# Patient Record
Sex: FEMALE
[De-identification: names, ages, dates, MRNs, and addresses within clinical notes are randomized per-mention and may not be internally consistent; named-entity substitution may affect disease eponyms.]

---

## 2022-10-27 PROBLEM — Z00.00 ENCOUNTER FOR PREVENTIVE HEALTH EXAMINATION: Status: ACTIVE | Noted: 2022-10-27

## 2022-10-28 ENCOUNTER — APPOINTMENT (OUTPATIENT)
Dept: ORTHOPEDIC SURGERY | Facility: CLINIC | Age: 42
End: 2022-10-28

## 2022-10-28 DIAGNOSIS — Z78.9 OTHER SPECIFIED HEALTH STATUS: ICD-10-CM

## 2022-10-28 DIAGNOSIS — Z86.018 PERSONAL HISTORY OF OTHER BENIGN NEOPLASM: ICD-10-CM

## 2022-10-28 DIAGNOSIS — M25.511 PAIN IN RIGHT SHOULDER: ICD-10-CM

## 2022-10-28 PROCEDURE — 73030 X-RAY EXAM OF SHOULDER: CPT | Mod: RT

## 2022-10-28 PROCEDURE — 99203 OFFICE O/P NEW LOW 30 MIN: CPT

## 2022-10-28 RX ORDER — TIRZEPATIDE 7.5 MG/.5ML
INJECTION, SOLUTION SUBCUTANEOUS
Refills: 0 | Status: ACTIVE | COMMUNITY

## 2022-11-06 ENCOUNTER — FORM ENCOUNTER (OUTPATIENT)
Age: 42
End: 2022-11-06

## 2022-11-17 RX ORDER — METHYLPREDNISOLONE 4 MG/1
4 TABLET ORAL
Qty: 1 | Refills: 0 | Status: ACTIVE | COMMUNITY
Start: 2022-11-17 | End: 1900-01-01

## 2022-11-17 NOTE — DISCUSSION/SUMMARY
[de-identified] : With a history of multiple osteochondromatosis and reoccurant symptoms in the right scapula I feel that further imaging (MRI of the RT scalpula) would be recommended.\par \par Patient sent for an MRI of right shoulder \par Patient will follow up after MRI for results and further evaluation \par Discussed options such as stretches and massages

## 2022-11-17 NOTE — HISTORY OF PRESENT ILLNESS
[de-identified] : Pt is here today for pain in her RT shoulder. Over the past three months she started having on and off pain. Now it hurts everyday and she has noticed that her right arm is longer than the left. Something that had happened before her surgery. Tylenol and ibuprofen for pain. PMHX multiple osteochondroma, 15 years ago she had a surgery to remove the one on her scapula.

## 2022-11-17 NOTE — PHYSICAL EXAM
[] : motor and sensory intact distally [Right] : right shoulder [FreeTextEntry9] : full ROM of cervical spine [FreeTextEntry3] : healed vertical surgical scar in right scapula [FreeTextEntry8] : Tenderness at scar [FreeTextEntry1] : osteochondroma.  [FreeTextEntry5] : Present xray of the RT scapula show some residual bone which is most probably previous surgery for excision of an osteochondroma.

## 2022-11-18 RX ORDER — IBUPROFEN 800 MG/1
800 TABLET, FILM COATED ORAL 3 TIMES DAILY
Qty: 90 | Refills: 0 | Status: ACTIVE | COMMUNITY
Start: 2022-11-18 | End: 1900-01-01

## 2022-12-06 ENCOUNTER — RESULT REVIEW (OUTPATIENT)
Age: 42
End: 2022-12-06

## 2022-12-12 ENCOUNTER — APPOINTMENT (OUTPATIENT)
Dept: ORTHOPEDIC SURGERY | Facility: CLINIC | Age: 42
End: 2022-12-12

## 2022-12-12 VITALS — HEIGHT: 66 IN | BODY MASS INDEX: 35.36 KG/M2 | WEIGHT: 220 LBS

## 2022-12-12 DIAGNOSIS — Z78.9 OTHER SPECIFIED HEALTH STATUS: ICD-10-CM

## 2022-12-12 DIAGNOSIS — Q78.6 MULTIPLE CONGENITAL EXOSTOSES: ICD-10-CM

## 2022-12-12 DIAGNOSIS — M54.12 RADICULOPATHY, CERVICAL REGION: ICD-10-CM

## 2022-12-12 DIAGNOSIS — M89.8X1 OTHER SPECIFIED DISORDERS OF BONE, SHOULDER: ICD-10-CM

## 2022-12-12 PROCEDURE — 99214 OFFICE O/P EST MOD 30 MIN: CPT

## 2022-12-12 PROCEDURE — 73010 X-RAY EXAM OF SHOULDER BLADE: CPT | Mod: RT

## 2022-12-12 RX ORDER — METHYLPREDNISOLONE 4 MG/1
4 TABLET ORAL
Qty: 1 | Refills: 0 | Status: ACTIVE | COMMUNITY
Start: 2022-12-12 | End: 1900-01-01

## 2022-12-18 PROBLEM — Q78.6: Status: ACTIVE | Noted: 2022-10-28

## 2022-12-18 NOTE — IMAGING
[de-identified] : RIGHT SHOULDER EXAM\par +ttp at medial scapular border. incision overlying scapula longitudinal, no erythema, well healed. Full range of shoulder with mild discomfort.\par Skin intact\par No muscle atrophy noted\par Shoulder ROM\par   Forward flexion to 160°; contralateral shoulder 160°\par   Abduction to 160°\par   ER with elbow at side to 45°; contralateral shoulder 45°\par   IR to L1\par \par - Julisa empty can test\par - Hawkin’s impingement test\par - Speed's test\par - Apprehension test\par - Lift-off test\par - Cross-body adduction test\par \par Rotator cuff strength is 5/5 throughout\par Hand is warm and well perfused with brisk capillary refill throughout\par Motor function intact to axillary, AIN, PIN, and ulnar nerves\par Sensation intact axillary, median, ulnar, and superficial radial nerves\par Elbow and wrist motion intact and full\par Patient able to make a composite fist\par No overt numbness/tingling in bilateral UE dermatomes.\par \par Right scapula radiographs with evidence of bony mass nor fracture nor dislocation.\par Right shoulder radiographs from previous visit with fracture, dislocation nor arthrosis.\par Right shoulder MRI without mass. labral fraying, AC joint arthrosis.

## 2022-12-18 NOTE — HISTORY OF PRESENT ILLNESS
[de-identified] : 42F, RHD, PMHX of osetochondroma presents with right shoulder pain since July 2022. Patient reports in October 2022, going to see Dr. Casey, x-rays taken and was sent for an MRI. MRI kept getting denied but was finally approved. She reports having a lot of pain, progressively getting worse. Patient with 10+year hx of  scapula mass excision. Admits to going to  for MRI. Concerned because it was just the shoulder and not the scapula that was imaged. She admits to still having pain, denies numbness/tingling. Difficulty with ROM. Taking Ibuprofen 800mg QID without much relief. Added Tylenol w/o relief. Does have periscapular pain.

## 2022-12-18 NOTE — REASON FOR VISIT
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 Series 3,\par image 14.

## 2022-12-18 NOTE — ASSESSMENT
[FreeTextEntry1] : Right scapular pain - reviewed radiographs and pathoanatomy with patient. PatiEnt with distant history of right scapular mass excision and now with recurrent pain. Right shoulder MRI did no capture scapula. Will obtain right scapula MRI without contrast to evaluate for recurrence of mass and will followup thereafter to discuss results and develop plan of care.\par \par C-spine radiculopathy - Discussed her periscapular pain may in fact be from C-spine radiculopathy. Discussed management to consist of PT and spine followup.\par \par F/u after MRI

## 2022-12-27 ENCOUNTER — APPOINTMENT (OUTPATIENT)
Dept: ORTHOPEDIC SURGERY | Facility: CLINIC | Age: 42
End: 2022-12-27

## 2023-01-13 ENCOUNTER — RESULT REVIEW (OUTPATIENT)
Age: 43
End: 2023-01-13

## 2023-01-30 ENCOUNTER — APPOINTMENT (OUTPATIENT)
Dept: ORTHOPEDIC SURGERY | Facility: CLINIC | Age: 43
End: 2023-01-30

## 2025-02-13 ENCOUNTER — APPOINTMENT (OUTPATIENT)
Dept: ULTRASOUND IMAGING | Facility: CLINIC | Age: 45
End: 2025-02-13